# Patient Record
Sex: FEMALE | Race: WHITE | Employment: PART TIME | URBAN - METROPOLITAN AREA
[De-identification: names, ages, dates, MRNs, and addresses within clinical notes are randomized per-mention and may not be internally consistent; named-entity substitution may affect disease eponyms.]

---

## 2017-10-24 ENCOUNTER — OFFICE VISIT (OUTPATIENT)
Dept: FAMILY MEDICINE CLINIC | Age: 42
End: 2017-10-24

## 2017-10-24 VITALS
DIASTOLIC BLOOD PRESSURE: 70 MMHG | HEIGHT: 67 IN | WEIGHT: 139.6 LBS | SYSTOLIC BLOOD PRESSURE: 120 MMHG | BODY MASS INDEX: 21.91 KG/M2

## 2017-10-24 DIAGNOSIS — M05.79 RHEUMATOID ARTHRITIS INVOLVING MULTIPLE SITES WITH POSITIVE RHEUMATOID FACTOR (HCC): Primary | ICD-10-CM

## 2017-10-24 LAB
A/G RATIO: 1.6 (ref 1.1–2.2)
ALBUMIN SERPL-MCNC: 4.5 G/DL (ref 3.4–5)
ALP BLD-CCNC: 38 U/L (ref 40–129)
ALT SERPL-CCNC: 17 U/L (ref 10–40)
ANION GAP SERPL CALCULATED.3IONS-SCNC: 20 MMOL/L (ref 3–16)
AST SERPL-CCNC: 26 U/L (ref 15–37)
BILIRUB SERPL-MCNC: 0.3 MG/DL (ref 0–1)
BUN BLDV-MCNC: 10 MG/DL (ref 7–20)
CALCIUM SERPL-MCNC: 9.5 MG/DL (ref 8.3–10.6)
CHLORIDE BLD-SCNC: 96 MMOL/L (ref 99–110)
CHOLESTEROL, TOTAL: 214 MG/DL (ref 0–199)
CO2: 22 MMOL/L (ref 21–32)
CREAT SERPL-MCNC: 0.6 MG/DL (ref 0.6–1.1)
GFR AFRICAN AMERICAN: >60
GFR NON-AFRICAN AMERICAN: >60
GLOBULIN: 2.9 G/DL
GLUCOSE BLD-MCNC: 73 MG/DL (ref 70–99)
HCT VFR BLD CALC: 42.5 % (ref 36–48)
HDLC SERPL-MCNC: 76 MG/DL (ref 40–60)
HEMOGLOBIN: 14.1 G/DL (ref 12–16)
LDL CHOLESTEROL CALCULATED: 120 MG/DL
MCH RBC QN AUTO: 30.7 PG (ref 26–34)
MCHC RBC AUTO-ENTMCNC: 33.2 G/DL (ref 31–36)
MCV RBC AUTO: 92.6 FL (ref 80–100)
PDW BLD-RTO: 12.4 % (ref 12.4–15.4)
PLATELET # BLD: 246 K/UL (ref 135–450)
PMV BLD AUTO: 8.2 FL (ref 5–10.5)
POTASSIUM SERPL-SCNC: 4 MMOL/L (ref 3.5–5.1)
RBC # BLD: 4.59 M/UL (ref 4–5.2)
RHEUMATOID FACTOR: <10 IU/ML
SEDIMENTATION RATE, ERYTHROCYTE: 16 MM/HR (ref 0–20)
SODIUM BLD-SCNC: 138 MMOL/L (ref 136–145)
TOTAL PROTEIN: 7.4 G/DL (ref 6.4–8.2)
TRIGL SERPL-MCNC: 90 MG/DL (ref 0–150)
TSH REFLEX: 1.34 UIU/ML (ref 0.27–4.2)
URIC ACID, SERUM: 3.5 MG/DL (ref 2.6–6)
VLDLC SERPL CALC-MCNC: 18 MG/DL
WBC # BLD: 6.1 K/UL (ref 4–11)

## 2017-10-24 PROCEDURE — 99203 OFFICE O/P NEW LOW 30 MIN: CPT | Performed by: FAMILY MEDICINE

## 2017-10-24 PROCEDURE — 36415 COLL VENOUS BLD VENIPUNCTURE: CPT | Performed by: FAMILY MEDICINE

## 2017-10-24 ASSESSMENT — ENCOUNTER SYMPTOMS
VOMITING: 0
WHEEZING: 0
SORE THROAT: 0
EYE PAIN: 0
SHORTNESS OF BREATH: 0
DIARRHEA: 0
BLOOD IN STOOL: 0
ABDOMINAL PAIN: 0
TROUBLE SWALLOWING: 0
COUGH: 0
BACK PAIN: 0

## 2017-10-24 ASSESSMENT — PATIENT HEALTH QUESTIONNAIRE - PHQ9
SUM OF ALL RESPONSES TO PHQ QUESTIONS 1-9: 0
SUM OF ALL RESPONSES TO PHQ9 QUESTIONS 1 & 2: 0
2. FEELING DOWN, DEPRESSED OR HOPELESS: 0
1. LITTLE INTEREST OR PLEASURE IN DOING THINGS: 0

## 2017-10-24 NOTE — PROGRESS NOTES
BW drawn 2 sst 1 purple Right arm  No complaints
Normal rate, regular rhythm, normal heart sounds and intact distal pulses. No murmur heard. Pulmonary/Chest: Effort normal and breath sounds normal. No respiratory distress. Abdominal: Soft. Bowel sounds are normal. There is no tenderness. Musculoskeletal: She exhibits no edema. Mild joint swelling at PIP and DIP   min changes at MP joints   no subQ changes  No redness   mild stiffness with grasping   Lymphadenopathy:     She has no cervical adenopathy. Neurological: She is alert and oriented to person, place, and time. Skin: Skin is warm. No rash noted. Psychiatric: She has a normal mood and affect. Thought content normal.       Assessment:      Assessment/Plan:  Jovan Pugh was seen today for established new doctor.     Diagnoses and all orders for this visit:    Rheumatoid arthritis involving multiple sites with positive rheumatoid factor (HCC)  -     Comprehensive Metabolic Panel  -     CBC  -     Sedimentation Rate  -     Rheumatoid Factor  -     TSH with Reflex  -     BHAVIK  -     Lipid Panel  -     Uric Acid  -     Christin Brock MD (Rheumatology)            Plan:      Lab, referral dr Tommy Perez maintenance issues reviewed with patient/family regarding immunizations, colonoscopy, mammo/PAP, eye care, PSA etc .  meds per rheumatology

## 2017-10-26 LAB
ANA INTERPRETATION: ABNORMAL
ANA PATTERN: ABNORMAL
ANA TITER: ABNORMAL
ANTI-NUCLEAR ANTIBODY (ANA): POSITIVE
PATHOLOGIST: ABNORMAL

## 2017-11-02 ENCOUNTER — TELEPHONE (OUTPATIENT)
Dept: INTERNAL MEDICINE CLINIC | Age: 42
End: 2017-11-02

## 2017-11-02 NOTE — TELEPHONE ENCOUNTER
Patient is scheduled for new patient appointment with Dr Rick Villalobos on 11/28/17. She was referred by Dr Luke Rashid. Records in [facility].

## 2017-11-28 ENCOUNTER — OFFICE VISIT (OUTPATIENT)
Dept: RHEUMATOLOGY | Age: 42
End: 2017-11-28

## 2017-11-28 VITALS
SYSTOLIC BLOOD PRESSURE: 110 MMHG | HEIGHT: 67 IN | WEIGHT: 140 LBS | BODY MASS INDEX: 21.97 KG/M2 | DIASTOLIC BLOOD PRESSURE: 68 MMHG

## 2017-11-28 DIAGNOSIS — R76.8 POSITIVE ANA (ANTINUCLEAR ANTIBODY): ICD-10-CM

## 2017-11-28 DIAGNOSIS — M25.50 POLYARTHRALGIA: ICD-10-CM

## 2017-11-28 DIAGNOSIS — M25.50 POLYARTHRALGIA: Primary | ICD-10-CM

## 2017-11-28 LAB
C-REACTIVE PROTEIN: 1.7 MG/L (ref 0–5.1)
HEPATITIS B CORE IGM ANTIBODY: NORMAL
HEPATITIS B SURFACE ANTIGEN INTERPRETATION: NORMAL
HEPATITIS C ANTIBODY INTERPRETATION: NORMAL
SEDIMENTATION RATE, ERYTHROCYTE: 15 MM/HR (ref 0–20)

## 2017-11-28 PROCEDURE — 99244 OFF/OP CNSLTJ NEW/EST MOD 40: CPT | Performed by: INTERNAL MEDICINE

## 2017-11-28 RX ORDER — PREDNISONE 1 MG/1
5 TABLET ORAL 2 TIMES DAILY
Qty: 42 TABLET | Refills: 0 | Status: SHIPPED | OUTPATIENT
Start: 2017-11-28 | End: 2017-12-21

## 2017-11-28 NOTE — PROGRESS NOTES
2017  Patient Name: Laurita Adams  : 1975  Medical Record: [id number]    MEDICATIONS  Current Outpatient Prescriptions   Medication Sig Dispense Refill    predniSONE (DELTASONE) 5 MG tablet Take 1 tablet by mouth 2 times daily 42 tablet 0    fluticasone (FLONASE) 50 MCG/ACT nasal spray 2 sprays by Nasal route daily. 2 sprays each side once/day 1 Bottle 5    acetaminophen (TYLENOL) 500 MG tablet Take 500 mg by mouth every 6 hours as needed for Pain.  polyethyl glycol-propyl glycol 0.4-0.3 % (SYSTANE) 0.4-0.3 % ophthalmic solution 1 drop as needed for Dry Eyes.  ibuprofen (ADVIL;MOTRIN) 200 MG tablet Take 200 mg by mouth every 6 hours as needed for Pain.  b complex vitamins capsule Take 1 capsule by mouth daily.  Cyanocobalamin (VITAMIN B 12 PO) Take 1,000 mg by mouth.  Multiple Vitamins-Minerals (MULTIVITAMIN PO) Take  by mouth.  Iron 66 MG TABS Take  by mouth.  NECON 50, 28, 1-50 MG-MCG TABS        No current facility-administered medications for this visit. ALLERGIES  Allergies   Allergen Reactions    Bactrim Ds [Sulfamethoxazole-Trimethoprim] Other (See Comments)     Sensitivity. Hands tingling and did not \"feel right\".  Macrodantin [Nitrofurantoin Macrocrystal] Other (See Comments)     Sensitivity. Hands tingling and did not \"feel right\"         Comments  No specialty comments available. REFERRING PHYSICIAN: Francesco Olson MD     HISTORY OF PRESENT ILLNESS  Laurita Adams is a 38 y.o. female who is being seen for follow up evaluation of  joint pain. She has seen Dr. Bryan Noguera 3 years ago for positive BHAVIK, further workup was ordered which was unremarkable. She is complaining of pain in the hands located in the PIP joints worse in her right fourth and fifth PIP joint. She describes her pain as constant, 4 out of 10, throbbing, worse with activity/overexertion and mild relief with Tylenol.   She takes Tylenol as needed with mild improvement in symptoms. Her pain occasionally flares up to 7 out of 10. She has had swelling in the right fourth and fifth PIP joint. She has stiffness which is present throughout the day but is worse in the morning. She has occasionally difficulty opening jars cans. Her grandmother has rheumatoid arthritis. She had a workup by primary care physician which showed positive BHAVIK, negative RF and normal ESR. She denies any fatigue, weight loss, fevers. She has occasional sores in the nose. HPI  ROS    REVIEW OF SYSTEMS: Occasional sores in the nose, itching in the eyes  Constitutional: No unanticipated weight loss or fevers. No fatigue and malaise. Integumentary: No rash, photosensitivity, malar rash, livedo reticularis, alopecia and Raynaud's symptoms, sclerodactyly, skin tightening  Eyes: negative for dry eyes, visual disturbance and persistent redness, discharge from eyes   ENT: - No tinnitus, loss of hearing, vertigo, or recurrent ear infections.  - No history of oral ulcers. - No history of sicca symptoms. Cardiovascular: No history of pericarditis, chest pain or murmur or palpitations  Respiratory: No shortness of breath, cough or history of interstitial lung disease. No history of pleurisy. No history of tuberculosis or atypical infections. Gastrointestinal: No history of heart burn, dysphagia or esophageal dysmotility. No change in bowel habits or any inflammatory bowel disease. Genitourinary: No history renal disease, miscarriages. Hematologic/Lymphatic: No abnormal bruising or bleeding, blood clots or swollen lymph nodes. Neurological: No history seizure or focal weakness. No history of neuropathies, paresthesias or hyperesthesias, facial droop, diplopia  Psychiatric: No history of bipolar disease, anxiety, depression  Endocrine: Denies any thyroid / parathyroid disease and osteoporosis  Allergic/Immunologic: No nasal congestion or hives.         I have reviewed patients Past medical History, Social History and Family History as mentioned in her chart and this remains unchanged from previous. History reviewed. No pertinent past medical history. History reviewed. No pertinent surgical history. Social History     Social History    Marital status:      Spouse name: N/A    Number of children: N/A    Years of education: N/A     Occupational History    Not on file.      Social History Main Topics    Smoking status: Never Smoker    Smokeless tobacco: Never Used    Alcohol use Not on file    Drug use: Unknown    Sexual activity: Not on file     Other Topics Concern    Not on file     Social History Narrative    No narrative on file     Family History   Problem Relation Age of Onset    Hypertension Mother     Hypertension Father     Other Father      heart disease    Rheum Arthritis Maternal Grandmother          PHYSICAL EXAM   Vitals:    11/28/17 1019   BP: 110/68   Weight: 140 lb (63.5 kg)   Height: 5' 7\" (1.702 m)     Physical Exam  Constitutional:  Well developed, well nourished, no acute distress, non-toxic appearance   Musculoskeletal:    RIGHT  Swell  Tender  ROM  LEFT  Swell  Tender  ROM    DIP2  0  0  FULL  0  0  FULL    DIP3  0  0  FULL   0  0  FULL    DIP4  0  0  FULL   0  0  FULL    DIP5  0  0  [place]den   0  0  Heberden    PIP1  0  0  FULL   0  0  FULL    PIP2  + + FULL   0  0  FULL    PIP3  +  +  FULL   0  0  FULL    PIP4  +  +  FULL   0  0  FULL    PIP5  +  +  FULL   0  0  FULL    MCP1  0  0  FULL   0  0  FULL    MCP2  0  0  FULL   0  0  FULL    MCP3  0  0  FULL   0  0  FULL    MCP4  0  0  FULL   0  0  FULL    MCP5  0  0  FULL   0  0  FULL    Wrist  0  0  FULL   0  0  FULL    Elbow  0  0  FULL   0  0  FULL    Shouldr  0  0  FULL   0  0  FULL    Hip  0  0  FULL   0  0  FULL    Knee  0  0  FULL   0  0  FULL    Ankle  0  0  FULL   0  0  FULL    MTP1  0  0  FULL   0  0  FULL    MTP2  0  0  FULL   0  0  FULL    MTP3  0  0  FULL   0  0  FULL    MTP4  0  0  FULL   0  0  FULL    MTP5  0  0  FULL ANATITER 1:160 10/24/2017    ANAINT see below 10/24/2017    YARELY Sheffield MD,PhD 10/24/2017     Lab Results   Component Value Date    DSDNAG None Detected 07/08/2014     Lab Results   Component Value Date    SSAROAB Negative 07/08/2014    SSALAAB Negative 07/08/2014     Lab Results   Component Value Date    SMAB Negative 07/08/2014    RNPAB Negative 07/08/2014     ASSESSMENT AND PLAN      Assessment/Plan:      ASSESSMENT:    1. Polyarthralgia    2. Positive BHAVIK (antinuclear antibody)        PLAN:     Demetrio Dupont was seen today for new patient. Diagnoses and all orders for this visit:    Polyarthralgia-Possible inflammatory arthritis/seronegative rheumatoid arthritis  -RF negative, normal ESR  -I will do further workup for rheumatoid arthritis/systemic inflammatory disease  -Start Prednisone 5 mg twice a day for 3 weeks and reassess  -     C-Reactive Protein; Future  -     Cyclic Citrul Peptide Antibody, IgG; Future  -     Hepatitis B Core Antibody, IgM; Future  -     Hepatitis B Surface Antigen; Future  -     Hepatitis C Antibody; Future  -     Sedimentation Rate; Future  -     XR Hand Bilateral Minimum 3 VW; Future  -     predniSONE (DELTASONE) 5 MG tablet; Take 1 tablet by mouth 2 times daily    Positive BHAVKI (antinuclear antibody)-Positive BHAVIK 1:160 homogeneous pattern. Implications of low titer positive BHAVIK were discussed with patient. About 15-20% of normal healthy individuals at her age may have low titer positive BHAVIK of unclear clinical significance. -Check other serologies to evaluate for positive BHAVIK    -     Anti-DNA Antibody, Double-Stranded; Future  -     Sjogrens syndrome-A extractable nuclear antibody; Future  -     Sjogrens syndrome-B extractable nuclear antibody; Future  -     Smith (SONJA) Antibody IgG; Future  -     RIBONUCLEIC PROTEIN ANTIBODY; Future      The patient indicates understanding of these issues and agrees with the plan. Return in about 3 weeks (around 12/19/2017).       The risks and

## 2017-11-28 NOTE — PATIENT INSTRUCTIONS
-     C-Reactive Protein; Future  -     Cyclic Citrul Peptide Antibody, IgG; Future  -     Hepatitis B Core Antibody, IgM; Future  -     Hepatitis B Surface Antigen; Future  -     Hepatitis C Antibody; Future  -     Sedimentation Rate; Future  -     XR Hand Bilateral Minimum 3 VW; Future  -     predniSONE (DELTASONE) 5 MG tablet; Take 1 tablet by mouth 2 times daily  -     Anti-DNA Antibody, Double-Stranded; Future  -     Sjogrens syndrome-A extractable nuclear antibody; Future  -     Sjogrens syndrome-B extractable nuclear antibody; Future  -     Smith (SONJA) Antibody IgG; Future  -     RIBONUCLEIC PROTEIN ANTIBODY;  Future

## 2017-11-30 LAB
CCP IGG ANTIBODIES: 4 UNITS (ref 0–19)
DOUBLE STRANDED DNA AB, IGG: NORMAL
ENA TO RNP ANTIBODY: 0 AU/ML (ref 0–40)
ENA TO SMITH (SM) ANTIBODY: 0 AU/ML (ref 0–40)
ENA TO SSB (LA) ANTIBODY: 3 AU/ML (ref 0–40)
SSA 52 (RO) (ENA) AB, IGG: 2 AU/ML (ref 0–40)
SSA 60 (RO) (ENA) AB, IGG: 11 AU/ML (ref 0–40)

## 2017-12-21 ENCOUNTER — OFFICE VISIT (OUTPATIENT)
Dept: RHEUMATOLOGY | Age: 42
End: 2017-12-21

## 2017-12-21 VITALS
DIASTOLIC BLOOD PRESSURE: 70 MMHG | HEIGHT: 67 IN | WEIGHT: 139.6 LBS | BODY MASS INDEX: 21.91 KG/M2 | SYSTOLIC BLOOD PRESSURE: 126 MMHG

## 2017-12-21 DIAGNOSIS — M19.90 INFLAMMATORY ARTHRITIS: Primary | ICD-10-CM

## 2017-12-21 DIAGNOSIS — R76.8 POSITIVE ANA (ANTINUCLEAR ANTIBODY): ICD-10-CM

## 2017-12-21 PROCEDURE — 99214 OFFICE O/P EST MOD 30 MIN: CPT | Performed by: INTERNAL MEDICINE

## 2017-12-21 RX ORDER — HYDROXYCHLOROQUINE SULFATE 200 MG/1
TABLET, FILM COATED ORAL
Qty: 45 TABLET | Refills: 5 | Status: SHIPPED | OUTPATIENT
Start: 2017-12-21 | End: 2021-04-14

## 2017-12-21 RX ORDER — PREDNISONE 1 MG/1
5 TABLET ORAL 2 TIMES DAILY
Qty: 10 TABLET | Refills: 1 | Status: SHIPPED | OUTPATIENT
Start: 2017-12-21 | End: 2017-12-26

## 2017-12-21 NOTE — PROGRESS NOTES
Tylenol. She takes Tylenol as needed with mild improvement in symptoms. Her pain occasionally flares up to 7 out of 10. She has had swelling in the right fourth and fifth PIP joint. She has stiffness which is present throughout the day but is worse in the morning. She has occasionally difficulty opening jars cans. Her grandmother has rheumatoid arthritis. She had a workup by primary care physician which showed positive BHAVIK, negative RF and normal ESR. She denies any fatigue, weight loss, fevers. She has occasional sores in the nose. Interim history: He presents for follow-up of polyarthralgia and positive BHAVIK. She has noticed significant improvement with prednisone. She finished prednisone 1 day ago. Joint pain, swelling and stiffness has improved by 80%. Blood work shows negative RF, CCP, normal ESR and CRP. Hand x-rays are normal.  HPI  ROS    REVIEW OF SYSTEMS: Occasional sores in the nose, itching in the eyes  Constitutional: No unanticipated weight loss or fevers. No fatigue and malaise. Integumentary: No rash, photosensitivity, malar rash, livedo reticularis, alopecia and Raynaud's symptoms, sclerodactyly, skin tightening  Eyes: negative for dry eyes, visual disturbance and persistent redness, discharge from eyes   ENT: - No tinnitus, loss of hearing, vertigo, or recurrent ear infections.  - No history of oral ulcers. - No history of sicca symptoms. Cardiovascular: No history of pericarditis, chest pain or murmur or palpitations  Respiratory: No shortness of breath, cough or history of interstitial lung disease. No history of pleurisy. No history of tuberculosis or atypical infections. Gastrointestinal: No history of heart burn, dysphagia or esophageal dysmotility. No change in bowel habits or any inflammatory bowel disease. Genitourinary: No history renal disease, miscarriages. Hematologic/Lymphatic: No abnormal bruising or bleeding, blood clots or swollen lymph nodes.   Neurological: No history seizure or focal weakness. No history of neuropathies, paresthesias or hyperesthesias, facial droop, diplopia  Psychiatric: No history of bipolar disease, anxiety, depression  Endocrine: Denies any thyroid / parathyroid disease and osteoporosis  Allergic/Immunologic: No nasal congestion or hives. I have reviewed patients Past medical History, Social History and Family History as mentioned in her chart and this remains unchanged from previous. History reviewed. No pertinent past medical history. History reviewed. No pertinent surgical history. Social History     Social History    Marital status:      Spouse name: N/A    Number of children: N/A    Years of education: N/A     Occupational History    Not on file.      Social History Main Topics    Smoking status: Never Smoker    Smokeless tobacco: Never Used    Alcohol use Not on file    Drug use: Unknown    Sexual activity: Not on file     Other Topics Concern    Not on file     Social History Narrative    No narrative on file     Family History   Problem Relation Age of Onset    Hypertension Mother     Hypertension Father     Other Father      heart disease    Rheum Arthritis Maternal Grandmother          PHYSICAL EXAM   Vitals:    12/21/17 0859   BP: 126/70   Weight: 139 lb 9.6 oz (63.3 kg)   Height: 5' 7\" (1.702 m)     Physical Exam  Constitutional:  Well developed, well nourished, no acute distress, non-toxic appearance   Musculoskeletal:    RIGHT  Swell  Tender  ROM  LEFT  Swell  Tender  ROM    DIP2  0  0  FULL  0  0  FULL    DIP3  0  0  FULL   0  0  FULL    DIP4  0  0  FULL   0  0  FULL    DIP5  0  0  Heberden   0  0  Heberden    PIP1  0  0  FULL   0  0  FULL    PIP2  0 0 FULL   0  0  FULL    PIP3  0 0 FULL   0  0  FULL    PIP4  0 0 FULL   0  0  FULL    PIP5  0 0 FULL   0  0  FULL    MCP1  0  0  FULL   0  0  FULL    MCP2  0  0  FULL   0  0  FULL    MCP3  0  0  FULL   0  0  FULL    MCP4  0  0  FULL   0  0  FULL    MCP5  0 AST 26 10/24/2017 1108    ALT 17 10/24/2017 1108    BILITOT 0.3 10/24/2017 1108          Lab Results   Component Value Date    SEDRATE 15 11/28/2017     Lab Results   Component Value Date    CRP 1.7 11/28/2017     Lab Results   Component Value Date    BHAVIK POSITIVE 10/24/2017     Lab Results   Component Value Date    RF <10.0 10/24/2017    CCPABIGG 4 11/28/2017     Lab Results   Component Value Date    BHAVIK POSITIVE 10/24/2017    ANATITER 1:160 10/24/2017    ANAINT see below 10/24/2017    PATH MARY Sheffield MD,PhD 10/24/2017     Lab Results   Component Value Date    DSDNAG None Detected 11/28/2017     Lab Results   Component Value Date    SSAROAB Negative 07/08/2014    SSALAAB 3 11/28/2017     Lab Results   Component Value Date    SMAB 0 11/28/2017    RNPAB 0 11/28/2017     ASSESSMENT AND PLAN      Assessment/Plan:      ASSESSMENT:    1. Inflammatory arthritis    2. Positive BHAVIK (antinuclear antibody)        PLAN:   1. Inflammatory arthritis  Given inflammatory features, likely synovitis on exam with significant improvement on prednisone, family history of rheumatoid arthritis, suspect inflammatory arthritis-possible seronegative rheumatoid arthritis  -I will start Plaquenil 200 mg in a.m. and 100 mg at night  -Prednisone for flareups  -If symptoms persist will consider methotrexate  - hydroxychloroquine (PLAQUENIL) 200 MG tablet; Take 1 tab in am and 1/2 tab in am  Dispense: 45 tablet; Refill: 5  - predniSONE (DELTASONE) 5 MG tablet; Take 1 tablet by mouth 2 times daily for 5 days  Dispense: 10 tablet; Refill: 1  Plaquenil can cause skin rash, GI issues, visual blurriness and increases the risk of retinal toxicity; yearly eye/retinal exam was advised while taking plaquenil. 2. Positive BHAVIK (antinuclear antibody)  Implications of low titer positive BHAVIK were discussed with patient. About 15-20% of normal healthy individuals at her age may have low titer positive BHAVIK of unclear clinical significance.   -Other

## 2017-12-21 NOTE — PATIENT INSTRUCTIONS
- hydroxychloroquine (PLAQUENIL) 200 MG tablet; Take 1 tab in am and 1/2 tab in am  Dispense: 45 tablet; Refill: 5  - predniSONE (DELTASONE) 5 MG tablet; Take 1 tablet by mouth 2 times daily for 5 days  Dispense: 10 tablet;  Refill: 1

## 2018-01-23 ENCOUNTER — HOSPITAL ENCOUNTER (OUTPATIENT)
Dept: MAMMOGRAPHY | Age: 43
Discharge: HOME | End: 2018-01-23
Attending: OBSTETRICS & GYNECOLOGY | Admitting: OBSTETRICS & GYNECOLOGY

## 2018-01-23 DIAGNOSIS — Z12.31 ENCOUNTER FOR SCREENING MAMMOGRAM FOR BREAST CANCER: ICD-10-CM

## 2018-01-23 DIAGNOSIS — Z80.3 FAMILY HISTORY OF MALIGNANT NEOPLASM OF BREAST: ICD-10-CM

## 2019-11-08 ENCOUNTER — HOSPITAL ENCOUNTER (OUTPATIENT)
Dept: WOMENS IMAGING | Age: 44
Discharge: HOME | End: 2019-11-08
Payer: COMMERCIAL

## 2019-11-08 DIAGNOSIS — Z12.31 ENCOUNTER FOR SCREENING MAMMOGRAM FOR BREAST CANCER: ICD-10-CM

## 2019-11-08 PROCEDURE — 77063 BREAST TOMOSYNTHESIS BI: CPT

## 2021-03-19 ENCOUNTER — HOSPITAL ENCOUNTER (OUTPATIENT)
Dept: WOMENS IMAGING | Age: 46
Discharge: HOME | End: 2021-03-19
Payer: COMMERCIAL

## 2021-03-19 DIAGNOSIS — Z12.31 VISIT FOR SCREENING MAMMOGRAM: ICD-10-CM

## 2021-03-19 PROCEDURE — 77063 BREAST TOMOSYNTHESIS BI: CPT

## 2021-03-23 ENCOUNTER — TELEPHONE (OUTPATIENT)
Dept: FAMILY MEDICINE CLINIC | Age: 46
End: 2021-03-23

## 2021-04-14 ENCOUNTER — OFFICE VISIT (OUTPATIENT)
Dept: PRIMARY CARE CLINIC | Age: 46
End: 2021-04-14
Payer: COMMERCIAL

## 2021-04-14 VITALS
DIASTOLIC BLOOD PRESSURE: 70 MMHG | HEART RATE: 90 BPM | BODY MASS INDEX: 21.82 KG/M2 | HEIGHT: 67 IN | SYSTOLIC BLOOD PRESSURE: 110 MMHG | OXYGEN SATURATION: 97 % | WEIGHT: 139 LBS

## 2021-04-14 DIAGNOSIS — M19.90 ARTHRITIS: Primary | ICD-10-CM

## 2021-04-14 DIAGNOSIS — Z23 NEED FOR TDAP VACCINATION: ICD-10-CM

## 2021-04-14 PROCEDURE — 99203 OFFICE O/P NEW LOW 30 MIN: CPT | Performed by: NURSE PRACTITIONER

## 2021-04-14 ASSESSMENT — ENCOUNTER SYMPTOMS
SHORTNESS OF BREATH: 0
CHEST TIGHTNESS: 0

## 2021-04-14 ASSESSMENT — PATIENT HEALTH QUESTIONNAIRE - PHQ9
1. LITTLE INTEREST OR PLEASURE IN DOING THINGS: 0
SUM OF ALL RESPONSES TO PHQ QUESTIONS 1-9: 0
SUM OF ALL RESPONSES TO PHQ9 QUESTIONS 1 & 2: 0

## 2021-04-14 NOTE — PATIENT INSTRUCTIONS
Patient Education        Well Visit, Ages 25 to 48: Care Instructions  Overview     Well visits can help you stay healthy. Your doctor has checked your overall health and may have suggested ways to take good care of yourself. Your doctor also may have recommended tests. At home, you can help prevent illness with healthy eating, regular exercise, and other steps. Follow-up care is a key part of your treatment and safety. Be sure to make and go to all appointments, and call your doctor if you are having problems. It's also a good idea to know your test results and keep a list of the medicines you take. How can you care for yourself at home? · Get screening tests that you and your doctor decide on. Screening helps find diseases before any symptoms appear. · Eat healthy foods. Choose fruits, vegetables, whole grains, protein, and low-fat dairy foods. Limit fat, especially saturated fat. Reduce salt in your diet. · Limit alcohol. If you are a man, have no more than 2 drinks a day or 14 drinks a week. If you are a woman, have no more than 1 drink a day or 7 drinks a week. · Get at least 30 minutes of physical activity on most days of the week. Walking is a good choice. You also may want to do other activities, such as running, swimming, cycling, or playing tennis or team sports. Discuss any changes in your exercise program with your doctor. · Reach and stay at a healthy weight. This will lower your risk for many problems, such as obesity, diabetes, heart disease, and high blood pressure. · Do not smoke or allow others to smoke around you. If you need help quitting, talk to your doctor about stop-smoking programs and medicines. These can increase your chances of quitting for good. · Care for your mental health. It is easy to get weighed down by worry and stress. Learn strategies to manage stress, like deep breathing and mindfulness, and stay connected with your family and community.  If you find you often feel sad or hopeless, talk with your doctor. Treatment can help. · Talk to your doctor about whether you have any risk factors for sexually transmitted infections (STIs). You can help prevent STIs if you wait to have sex with a new partner (or partners) until you've each been tested for STIs. It also helps if you use condoms (male or female condoms) and if you limit your sex partners to one person who only has sex with you. Vaccines are available for some STIs, such as HPV. · Use birth control if it's important to you to prevent pregnancy. Talk with your doctor about the choices available and what might be best for you. · If you think you may have a problem with alcohol or drug use, talk to your doctor. This includes prescription medicines (such as amphetamines and opioids) and illegal drugs (such as cocaine and methamphetamine). Your doctor can help you figure out what type of treatment is best for you. · Protect your skin from too much sun. When you're outdoors from 10 a.m. to 4 p.m., stay in the shade or cover up with clothing and a hat with a wide brim. Wear sunglasses that block UV rays. Even when it's cloudy, put broad-spectrum sunscreen (SPF 30 or higher) on any exposed skin. · See a dentist one or two times a year for checkups and to have your teeth cleaned. · Wear a seat belt in the car. When should you call for help? Watch closely for changes in your health, and be sure to contact your doctor if you have any problems or symptoms that concern you. Where can you learn more? Go to [email or web address]://[email or web address].[email or web address].com. org and sign in to your [employer] account. Enter P072 in the [employer] box to learn more about \"Well Visit, Ages 25 to 48: Care Instructions. \"     If you do not have an account, please click on the \"Sign Up Now\" link. Current as of: May 27, 2020               Content Version: 12.8  © 0984-7239 [employer]. Care instructions adapted under license by [facility] ([facility]). If you have questions about a medical condition or this instruction, always ask your healthcare professional. [address] any warranty or liability for your use of this information. Patient Education        Arthritis: Care Instructions  Overview     Arthritis, also called osteoarthritis, is a breakdown of the cartilage that cushions your joints. When the cartilage wears down, your bones rub against each other. This causes pain and stiffness. Many people have some arthritis as they age. Arthritis most often affects the joints of the spine, hands, hips, knees, or feet. Arthritis never goes away completely. But medicine and home treatment can help reduce pain and help you stay active. Follow-up care is a key part of your treatment and safety. Be sure to make and go to all appointments, and call your doctor if you are having problems. It's also a good idea to know your test results and keep a list of the medicines you take. How can you care for yourself at home? · Stay at a healthy weight. Being overweight puts extra strain on your joints. · Talk to your doctor or physical therapist about exercises that will help ease joint pain. ? Stretch. You may enjoy gentle forms of yoga to help keep your joints and muscles flexible. ? Walk instead of jog. Other types of exercise that are less stressful on the joints include riding a bike, swimming, anh chi, or water exercise. ? Lift weights. Strong muscles help reduce stress on your joints. Stronger thigh muscles, for example, take some of the stress off of the knees and hips. Learn the right way to lift weights so you don't make joint pain worse. · Take your medicines exactly as prescribed. Call your doctor if you think you are having a problem with your medicine. · Take pain medicines exactly as directed. ? If the doctor gave you a prescription medicine for pain, take it as prescribed.   ? If you are not taking a prescription pain information.

## 2021-04-14 NOTE — PROGRESS NOTES
4/14/2021    Chief Complaint   Patient presents with    New Patient    Joint Pain     bilateral hands       Flora Henriquez is a 48 y.o. female, presents today as a new patient to [facility], however new to me. HPI   Patient states she is overall healthy, recently had negative mammogram, recent PAP with gynecologist, she exercises regularly, eats a well balanced diet, and takes daily multivitamin and supplements. She has yearly biometric screenings through her 's employer/insurance. Reviewed results today and scanned into chart. Her total cholesterol and LDL slightly above upper normal limits, with HDL elevated. All other lab results were within normal range. Today she has some concerns for arthritis in hands, started several years ago and has waxed and waned. Patient's Paternal Grandmother has rheumatoid arthritis (RA) and both parents have arthritis. She made this appointment for a referral to rheumatology, Dr. Jennie Ledesma. She tried to schedule an appointment at rheumatology office and was told she needed a referral from primary care. She would like labs ordered by rheumatology to rule out RA. Patient is due to Tdap vaccine however 2nd COVID-19 vaccine was administered yesterday. Order written today to be administered at local pharmacy in 6-8 weeks. Patient to update office with administration date. Review of Systems   Constitutional: Negative for activity change, fatigue and unexpected weight change. Respiratory: Negative for chest tightness and shortness of breath. Cardiovascular: Negative for chest pain, palpitations and leg swelling. Musculoskeletal: Positive for arthralgias (bilateral hands). Negative for gait problem, joint swelling and myalgias. Neurological: Negative for weakness and numbness.        Current Outpatient Medications on File Prior to Visit   Medication Sig Dispense Refill    polyethyl glycol-propyl glycol 0.4-0.3 % (SYSTANE) 0.4-0.3 % ophthalmic solution 1 drop as needed for Dry Eyes.  b complex vitamins capsule Take 1 capsule by mouth daily.  Cyanocobalamin (VITAMIN B 12 PO) Take 1,000 mg by mouth.  Multiple Vitamins-Minerals (MULTIVITAMIN PO) Take  by mouth.  NECON 1/50, 28, 1-50 MG-MCG TABS        No current facility-administered medications on file prior to visit. Allergies   Allergen Reactions    Bactrim Ds [Sulfamethoxazole-Trimethoprim] Other (See Comments)     Sensitivity. Hands tingling and did not \"feel right\".  Macrodantin [Nitrofurantoin Macrocrystal] Other (See Comments)     Sensitivity. Hands tingling and did not \"feel right\"     History reviewed. No pertinent past medical history. History reviewed. No pertinent surgical history. Social History     Tobacco Use    Smoking status: Never Smoker    Smokeless tobacco: Never Used   Substance Use Topics    Alcohol use: Yes     Alcohol/week: 1.0 standard drinks     Types: 1 Glasses of wine per week      Family History   Problem Relation Age of Onset   Jo Ann Mons Hypertension Mother     Hypertension Father     Other Father         heart disease    Rheum Arthritis Maternal Grandmother     No Known Problems Daughter     No Known Problems Son         Vitals:    04/14/21 0823   BP: 110/70   Site: Left Upper Arm   Position: Sitting   Cuff Size: Medium Adult   Pulse: 90   SpO2: 97%   Weight: 139 lb (63 kg)   Height: 5' 7\" (1.702 m)     Estimated body mass index is 21.77 kg/m² as calculated from the following:    Height as of this encounter: 5' 7\" (1.702 m). Weight as of this encounter: 139 lb (63 kg). Physical Exam  Vitals signs and nursing note reviewed. Constitutional:       General: She is not in acute distress. Appearance: Normal appearance. She is normal weight.    HENT:      Right Ear: Tympanic membrane, ear canal and external ear normal.      Left Ear: Tympanic membrane, ear canal and external ear normal.      Nose: Nose normal. Mouth/Throat:      Mouth: Mucous membranes are moist.      Pharynx: Oropharynx is clear. Neck:      Musculoskeletal: Normal range of motion and neck supple. Vascular: No carotid bruit. Cardiovascular:      Rate and Rhythm: Normal rate and regular rhythm. Pulses: Normal pulses. Heart sounds: Normal heart sounds. No murmur. No friction rub. No gallop. Pulmonary:      Effort: Pulmonary effort is normal. No respiratory distress. Breath sounds: Normal breath sounds. Abdominal:      General: Abdomen is flat. Bowel sounds are normal.      Palpations: Abdomen is soft. Musculoskeletal: Normal range of motion. General: No swelling, tenderness, deformity or signs of injury. Right hand: Normal. She exhibits normal range of motion, no tenderness, no bony tenderness, normal capillary refill, no deformity and no swelling. Normal strength noted. Left hand: She exhibits normal range of motion, no tenderness, no bony tenderness, normal capillary refill, no deformity and no swelling. Normal strength noted. Right lower leg: No edema. Left lower leg: No edema. Lymphadenopathy:      Cervical: No cervical adenopathy. Skin:     General: Skin is warm and dry. Neurological:      Mental Status: She is alert and oriented to person, place, and time. Psychiatric:         Mood and Affect: Mood normal.         Behavior: Behavior normal.         ASSESSMENT/PLAN:  1. Arthritis  - Chronic  - External Referral To Rheumatology (Dr. Christal Carcamo). Patient to schedule appointment, phone number provided. - Arthritis lab panel to be ordered by Rheumatology. 2. Need for Tdap vaccination  - Tdap (age 6y and older) IM (Boostrix); Future in 6-8 weeks (2nd COVID vaccine administered yesterday)      Return in about 1 year (around 4/14/2022) for Annual physical exam or sooner if needed. .     Discussed use, benefit, and side effects of prescribed medications.      Patient's questions answered and

## 2022-04-29 LAB — MAMMOGRAPHY, EXTERNAL: NORMAL

## 2023-03-27 ENCOUNTER — OFFICE VISIT (OUTPATIENT)
Dept: PRIMARY CARE CLINIC | Age: 48
End: 2023-03-27
Payer: COMMERCIAL

## 2023-03-27 VITALS
HEIGHT: 67 IN | RESPIRATION RATE: 14 BRPM | DIASTOLIC BLOOD PRESSURE: 60 MMHG | WEIGHT: 147 LBS | BODY MASS INDEX: 23.07 KG/M2 | HEART RATE: 70 BPM | OXYGEN SATURATION: 97 % | TEMPERATURE: 98 F | SYSTOLIC BLOOD PRESSURE: 122 MMHG

## 2023-03-27 DIAGNOSIS — G89.29 CHRONIC NECK PAIN: Primary | ICD-10-CM

## 2023-03-27 DIAGNOSIS — Z12.12 ENCOUNTER FOR COLORECTAL CANCER SCREENING: ICD-10-CM

## 2023-03-27 DIAGNOSIS — M54.2 CHRONIC NECK PAIN: Primary | ICD-10-CM

## 2023-03-27 DIAGNOSIS — Z12.11 ENCOUNTER FOR COLORECTAL CANCER SCREENING: ICD-10-CM

## 2023-03-27 PROCEDURE — 99213 OFFICE O/P EST LOW 20 MIN: CPT | Performed by: NURSE PRACTITIONER

## 2023-03-27 RX ORDER — CETIRIZINE HYDROCHLORIDE 10 MG/1
10 TABLET ORAL DAILY
COMMUNITY

## 2023-03-27 SDOH — ECONOMIC STABILITY: FOOD INSECURITY: WITHIN THE PAST 12 MONTHS, THE FOOD YOU BOUGHT JUST DIDN'T LAST AND YOU DIDN'T HAVE MONEY TO GET MORE.: NEVER TRUE

## 2023-03-27 SDOH — ECONOMIC STABILITY: INCOME INSECURITY: HOW HARD IS IT FOR YOU TO PAY FOR THE VERY BASICS LIKE FOOD, HOUSING, MEDICAL CARE, AND HEATING?: NOT HARD AT ALL

## 2023-03-27 SDOH — ECONOMIC STABILITY: HOUSING INSECURITY
IN THE LAST 12 MONTHS, WAS THERE A TIME WHEN YOU DID NOT HAVE A STEADY PLACE TO SLEEP OR SLEPT IN A SHELTER (INCLUDING NOW)?: NO

## 2023-03-27 SDOH — ECONOMIC STABILITY: FOOD INSECURITY: WITHIN THE PAST 12 MONTHS, YOU WORRIED THAT YOUR FOOD WOULD RUN OUT BEFORE YOU GOT MONEY TO BUY MORE.: NEVER TRUE

## 2023-03-27 ASSESSMENT — PATIENT HEALTH QUESTIONNAIRE - PHQ9
SUM OF ALL RESPONSES TO PHQ QUESTIONS 1-9: 0
SUM OF ALL RESPONSES TO PHQ9 QUESTIONS 1 & 2: 0
1. LITTLE INTEREST OR PLEASURE IN DOING THINGS: 0
2. FEELING DOWN, DEPRESSED OR HOPELESS: 0

## 2023-03-27 ASSESSMENT — ENCOUNTER SYMPTOMS
APNEA: 0
SHORTNESS OF BREATH: 0
WHEEZING: 0
CHEST TIGHTNESS: 0
COUGH: 0
GASTROINTESTINAL NEGATIVE: 1
BACK PAIN: 0

## 2023-03-27 NOTE — PROGRESS NOTES
Vascular: No carotid bruit. Trachea: Trachea normal.   Cardiovascular:      Rate and Rhythm: Normal rate and regular rhythm. Pulses: Normal pulses. Heart sounds: Normal heart sounds, S1 normal and S2 normal.   Pulmonary:      Effort: Pulmonary effort is normal.      Breath sounds: Normal breath sounds. Musculoskeletal:         General: Normal range of motion. Cervical back: Normal range of motion and neck supple. No signs of trauma, rigidity or tenderness. No pain with movement, spinous process tenderness or muscular tenderness. Normal range of motion. Right lower leg: No edema. Left lower leg: No edema. Lymphadenopathy:      Cervical: No cervical adenopathy. Skin:     General: Skin is warm. Neurological:      General: No focal deficit present. Mental Status: She is alert and oriented to person, place, and time. Psychiatric:         Mood and Affect: Mood normal.         Behavior: Behavior normal.         Thought Content: Thought content normal.       ASSESSMENT/PLAN:  1. Chronic neck pain  - Chronic, intermittent neck pain  - Internal Referral to [facility]    2. Encounter for colorectal cancer screening  - [facility] - Darlene Tillman MD, Gastroenterology, SYMONE LEACH [facility]    Return if symptoms worsen or fail to improve. Discussed use, benefit, and side effects of prescribed medications. Patient's questions answered and concerns addressed. Patient agrees to plan of care. Electronically signed by HALLEY Dueñas CNP on 3/27/2023 at 10:20 PM       This dictation was generated by voice recognition computer software. Although all attempts are made to edit the dictation for accuracy, there may be errors in the transcription that are not intended.

## 2023-04-05 SDOH — HEALTH STABILITY: PHYSICAL HEALTH: ON AVERAGE, HOW MANY DAYS PER WEEK DO YOU ENGAGE IN MODERATE TO STRENUOUS EXERCISE (LIKE A BRISK WALK)?: 6 DAYS

## 2023-04-05 SDOH — HEALTH STABILITY: PHYSICAL HEALTH: ON AVERAGE, HOW MANY MINUTES DO YOU ENGAGE IN EXERCISE AT THIS LEVEL?: 50 MIN

## 2023-04-05 ASSESSMENT — SOCIAL DETERMINANTS OF HEALTH (SDOH)

## 2023-04-06 ENCOUNTER — OFFICE VISIT (OUTPATIENT)
Dept: ORTHOPEDIC SURGERY | Age: 48
End: 2023-04-06
Payer: COMMERCIAL

## 2023-04-06 DIAGNOSIS — M47.812 FACET ARTHRITIS OF CERVICAL REGION: ICD-10-CM

## 2023-04-06 DIAGNOSIS — M54.2 NECK PAIN: Primary | ICD-10-CM

## 2023-04-06 DIAGNOSIS — R51.9 OCCIPITAL PAIN: ICD-10-CM

## 2023-04-06 PROCEDURE — 99203 OFFICE O/P NEW LOW 30 MIN: CPT | Performed by: PHYSICIAN ASSISTANT

## 2023-04-06 NOTE — PROGRESS NOTES
about 2.0 standard drinks per week. She reports that she does not use drugs.    Family History:   Family History   Problem Relation Age of Onset    Hypertension Mother     Hypertension Father     Other Father         heart disease    High Blood Pressure Father     Rheum Arthritis Maternal Grandmother     No Known Problems Daughter     No Known Problems Son          Review of Systems:  I have reviewed the clinically relevant past medical history, medications, allergies, family history, social history, and 13 point Review of Systems from the patient's recent history form & documented any details relevant to today's presenting complaints in the history above. The patient's self-reported past medical history, medications, allergies, family history, social history, and Review of Systems form from today's date date have been scanned into the chart under the \"Media\" tab.    PHYSICAL EXAM:    Vitals:     GENERAL EXAM:  General Apparence: Patient is adequately groomed with no evidence of malnutrition.  Orientation: The patient is oriented to time, place and person.   Mood & Affect:The patient's mood and affect are appropriate   Vascular: Examination reveals no swelling tenderness in upper or lower extremities. Good capillary refill  Lymphatic: The lymphatic examination bilaterally reveals all areas to be without enlargement or induration  Sensation: Sensation is intact without deficit  Coordination/Balance: Good coordination. Tandem walking normal.     CERVICAL EXAMINATION:  Inspection: Local inspection shows no step-off or bruising.  Cervical alignment is normal.     Palpation: Mildly tender at the occipital cervical junction just lateral.  There is no tenderness at the midline below C2, and trapezius.  Paraspinal tenderness is absent. There is no step-off or paraspinal spasm.   Range of Motion: Cervical flexion, extension, and rotation are mildly reduced with pain.  Strength: 5/5 bilateral upper extremities   Special

## 2023-04-21 ENCOUNTER — HOSPITAL ENCOUNTER (OUTPATIENT)
Dept: PHYSICAL THERAPY | Age: 48
Setting detail: THERAPIES SERIES
Discharge: HOME | End: 2023-04-21
Payer: COMMERCIAL

## 2023-04-21 DIAGNOSIS — M54.2 CERVICAL PAIN (NECK): Primary | ICD-10-CM

## 2023-04-21 PROCEDURE — 97161 PT EVAL LOW COMPLEX 20 MIN: CPT | Performed by: PHYSICAL THERAPIST

## 2023-04-21 PROCEDURE — 97112 NEUROMUSCULAR REEDUCATION: CPT | Performed by: PHYSICAL THERAPIST

## 2023-04-21 PROCEDURE — 97140 MANUAL THERAPY 1/> REGIONS: CPT | Performed by: PHYSICAL THERAPIST

## 2023-04-21 NOTE — FLOWSHEET NOTE
[facility]  Phone: [phone]   Fax: [phone]      Physical Therapy Daily Treatment Note  Date:  2023     Patient Name:  Bubba Sandhu    :  1975  MRN: [id number]    Medical Diagnosis:  Facet arthritis of cervical region [M47.812]  Treatment Diagnosis:    ICD-10-CM    1.  Cervical pain (neck)  M54.2         Insurance/Certification information:  PT Insurance Information: [employer]- ded not met, [employer] auth, 60 visits  Referring Provider:  ONIEL Doyle   Has the plan of care been signed (Y/N):        []  Yes  [x]  No     Date of Patient follow up with Physician: none scheduled      Is this a Progress Report:     []  Yes  [x]  No        If Yes:  Date Range for reporting period:  Beginning- 2023    Ending    Progress report will be due (10 Rx or 30 days whichever is less): 10 visits or        Recertification will be due (POC Duration  / 90 days whichever is less): as above        Visit # Insurance Allowable Auth Required   1 [employer] auth, 60 visits per year [x]  Yes []  No        Functional Scale: NDI- 4%    Date assessed:  2023      Latex Allergy:  [x]NO      []YES  Preferred Language for Healthcare:   [x]English       []other:      Pain level:  0-5/10  on 2023    SUBJECTIVE:  See eval    OBJECTIVE: See eval  Observation:   Test measurements:    AROM Left  Right   Comments   Cervical Flexion     64 Mild tihgntess around C7   Cervical Extension     60 Mild pain L suboccipital   Cervical Side bend 38 38   Tightness B UTs   Cervical Rotation 55 65   Mild tigtness L suboccipital area   Thoracic rotation     ~20% limit B     Shoulder flex     WNL     Shoulder ABD     WNL     Shoulder IR           Shoulder ER at 90     WNL             SUPINE EXAM Normal Abnormal N/A Comments   Modified shear test           C2 spinous process kick           Tectorial membrane           Distraction x

## 2023-04-21 NOTE — PLAN OF CARE
sure what brings the pain on.    She denies lower extremity symptoms, gait abnormality, saddle numbness and bowel or bladder dysfunction    Relevant Medical History:  Functional Disability Index: NDI- 4%  Relevant Medication:   Tylenol occasionally for headache or stiffness  Current pain:  0/10  Pain at worst:  5-6/10- L side subocciptial area    Easing factors: applying some pressure into suboccipitals, laying in bed  Provocative factors:  turning head quickly to R or L,     Type: []Constant   [x]Intermittent  []Radiating [x]Localized []other:     Numbness/Tingling: none    Functional Limitations/Impairments: []Lifting/reaching []Grooming []Carrying    []ADL's []Driving []Sports/Recreations   []Other:    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; works out    Living Status/Prior Level of Function: Independent with ADLs and IADLs, without pain in neck      OBJECTIVE:    STANDING EXAM Normal Abnormal N/A Comments   [facility]         SEATED EXAM       Dermatomes Normal Abnormal N/A Comment   Posterior aspect of head (C2)       Posterior aspect of neck (C3)       AC jt (C4)       Lateral arm (C5)       Lateral forearm and palmar tip (C6)       Palmar distal phalynx middle finger (C7)       Palmar distal phalynx little finger (C8)       Medial forearm (T1)       Medial arm (T2)       Myotomes Normal Abnormal N/A Comments   Cervical rotation (C1)       Shoulder shrug (C2,3,4)       Shoulder abduction (C5)       Shoulder ER (C5,6)       Elbow flexion (C5,6)       Elbow extension (C7)       Thumb abduction (C8)       Little finger abduction (T1)       Finger adduction (T1)       Reflexes Normal Abnormal N/A Comments   C5-6 Biceps       C6 Brachioradialis       C7-8 Triceps       Clonus (>3 beats is +)       Babinski        Edward        Jaw Jerk        Pectoralis       Ronald       AROM Left  Right  Comments   Cervical Flexion   64 Mild tihgntess around C7   Cervical Extension   60 Mild pain L

## 2023-05-02 ENCOUNTER — HOSPITAL ENCOUNTER (OUTPATIENT)
Dept: PHYSICAL THERAPY | Age: 48
Setting detail: THERAPIES SERIES
Discharge: HOME | End: 2023-05-02
Payer: COMMERCIAL

## 2023-05-02 PROCEDURE — 97110 THERAPEUTIC EXERCISES: CPT | Performed by: PHYSICAL THERAPIST

## 2023-05-02 PROCEDURE — 97140 MANUAL THERAPY 1/> REGIONS: CPT | Performed by: PHYSICAL THERAPIST

## 2023-05-02 NOTE — FLOWSHEET NOTE
[facility]  Phone: [phone]   Fax: [phone]      Physical Therapy Daily Treatment Note  Date:  2023     Patient Name:  Redd Gusman    :  1975  MRN: [id number]    Medical Diagnosis:  Facet arthritis of cervical region [M47.812]  Treatment Diagnosis:    ICD-10-CM    1. Cervical pain (neck)  M54.2         Insurance/Certification information:  PT Insurance Information: [place] not met, Cohere auth, 60 visits  Referring Provider:  Sophie Bence, PA   Has the plan of care been signed (Y/N):        [x]  Yes  []  No     Date of Patient follow up with Physician: none scheduled      Is this a Progress Report:     []  Yes  [x]  No        If Yes:  Date Range for reporting period:  Beginning- 2023    Ending    Progress report will be due (10 Rx or 30 days whichever is less): 10 visits or        Recertification will be due (POC Duration  / 90 days whichever is less): as above        Visit # Insurance Allowable Auth Required   2 10 visits auth thru 23; 60 visits per year [x]  Yes []  No        Functional Scale: NDI- 4%    Date assessed:  2023      Latex Allergy:  [x]NO      []YES  Preferred Language for Healthcare:   [x]English       []other:      Pain level:  0-5/10  on 2023    SUBJECTIVE:  Pt says she has not been bad. She has been doing exercises every other day. She has only noticed her pain a few times recently which she thinks is a little better. When she did get her pain she did not get a headache with it this time.       OBJECTIVE:   Observation: T spine mobility: WNL prone- 23  Test measurements:  from eval  AROM Left  Right   Comments   Cervical Flexion     64 Mild tihgntess around C7   Cervical Extension     60 Mild pain L suboccipital   Cervical Side bend 38 38   Tightness B UTs   Cervical Rotation 55 65   Mild tigtness L suboccipital area   Thoracic rotation

## 2023-07-24 ENCOUNTER — OFFICE VISIT (OUTPATIENT)
Dept: PRIMARY CARE CLINIC | Age: 48
End: 2023-07-24
Payer: COMMERCIAL

## 2023-07-24 VITALS
OXYGEN SATURATION: 98 % | HEIGHT: 67 IN | RESPIRATION RATE: 14 BRPM | DIASTOLIC BLOOD PRESSURE: 60 MMHG | SYSTOLIC BLOOD PRESSURE: 102 MMHG | HEART RATE: 87 BPM | TEMPERATURE: 97 F | BODY MASS INDEX: 22.6 KG/M2 | WEIGHT: 144 LBS

## 2023-07-24 DIAGNOSIS — R20.2 RIGHT HAND PARESTHESIA: ICD-10-CM

## 2023-07-24 DIAGNOSIS — G89.29 CHRONIC RIGHT SHOULDER PAIN: Primary | ICD-10-CM

## 2023-07-24 DIAGNOSIS — M25.511 CHRONIC RIGHT SHOULDER PAIN: Primary | ICD-10-CM

## 2023-07-24 PROBLEM — M79.601 RIGHT ARM PAIN: Status: ACTIVE | Noted: 2023-07-24

## 2023-07-24 PROCEDURE — 99214 OFFICE O/P EST MOD 30 MIN: CPT | Performed by: NURSE PRACTITIONER

## 2023-07-24 RX ORDER — IBUPROFEN 800 MG/1
800 TABLET ORAL
Qty: 90 TABLET | Refills: 0 | Status: SHIPPED | OUTPATIENT
Start: 2023-07-24 | End: 2023-08-23

## 2023-07-24 RX ORDER — METHYLPREDNISOLONE 4 MG/1
TABLET ORAL
Qty: 1 KIT | Refills: 0 | Status: SHIPPED | OUTPATIENT
Start: 2023-07-24 | End: 2023-07-30

## 2023-07-24 ASSESSMENT — ENCOUNTER SYMPTOMS
BACK PAIN: 0
RESPIRATORY NEGATIVE: 1

## 2023-07-24 NOTE — PROGRESS NOTES
7/24/2023    Chief Complaint   Patient presents with    Arm Pain     Right side, started about 3 months ago. Olinda Castaneda is a 46 y.o. female, presents today for right posterior shoulder pain, numbness and tingling of right hand/fingers. HPI  Arm Pain   There was no injury mechanism (Onset ~3 months (early May 2023). Pain scale: 4/10 to 5/10 posterior shoulder and occasionally right elbow. Associated symptoms include numbness (right hand/fingers- daily with intermittent symptoms) and tingling (right hand- intermittent). Treatments tried: chiropractor adjustments, TENS unit, NSAIDs, Tylenol, massage. The treatment provided mild relief. Onset was ~3 months (early May 2023). Symptoms are interfering with sleep- she wakes 4 hours after falling asleep than sleep is on and off after 4 hours. Review of Systems   Constitutional: Negative. Respiratory: Negative. Cardiovascular: Negative. Musculoskeletal:  Negative for arthralgias, back pain, gait problem, joint swelling and neck pain. Positive for right posterior shoulder pain. Occasionally right elbow pain   Skin: Negative. Neurological:  Positive for tingling (right hand- intermittent) and numbness (right hand/fingers- daily with intermittent symptoms). Negative for tremors and weakness. Current Outpatient Medications on File Prior to Visit   Medication Sig Dispense Refill    cetirizine (ZYRTEC) 10 MG tablet Take 1 tablet by mouth daily      polyethyl glycol-propyl glycol 0.4-0.3 % (SYSTANE) 0.4-0.3 % ophthalmic solution 1 drop as needed for Dry Eyes      b complex vitamins capsule Take 1 capsule by mouth daily      Cyanocobalamin (VITAMIN B 12 PO) Take 1,000 mg by mouth. Multiple Vitamins-Minerals (MULTIVITAMIN PO) Take  by mouth. No current facility-administered medications on file prior to visit.       Allergies   Allergen Reactions    Bactrim Ds [Sulfamethoxazole-Trimethoprim] Other (See Comments)

## 2023-07-27 ENCOUNTER — OFFICE VISIT (OUTPATIENT)
Dept: ORTHOPEDIC SURGERY | Age: 48
End: 2023-07-27

## 2023-07-27 VITALS — WEIGHT: 144 LBS | HEIGHT: 67 IN | BODY MASS INDEX: 22.6 KG/M2

## 2023-07-27 DIAGNOSIS — R20.2 PARESTHESIA OF RIGHT ARM: ICD-10-CM

## 2023-07-27 DIAGNOSIS — M54.2 NECK PAIN: ICD-10-CM

## 2023-07-27 DIAGNOSIS — M47.812 CERVICAL SPONDYLOSIS: ICD-10-CM

## 2023-07-31 NOTE — PROGRESS NOTES
Procedures:     Orders Placed This Encounter   Procedures    Margy Mcdonald MD (Inpatient and Outpatient EMG), [facility]     Referral Priority:   Routine     Referral Type:   Eval and Treat     Referral Reason:   Specialty Services Required     Referred to Provider:   Kaitlin Aguilar MD     Requested Specialty:   Neurology     Number of Visits Requested:   1           Other Outside Imaging and Testing Personally Reviewed:    X-rays referenced from April 2023: Relative straightening of the cervical spine normal alignment on the AP projection. No areas of focal advanced intervertebral to space narrowing and only mild spondylosis. Assessment   Impression: . Encounter Diagnoses   Name Primary? Paresthesia of right arm     Cervical spondylosis     Neck pain         Rule out cubital tunnel syndrome, rule out cervical radiculopathy      Plan: Activity modification cervical spine precautions  Stabilization home exercise program  Ulnar nerve gliding exercises and nocturnal elbow splinting-[place]  Complete the course of Medrol as prescribed  Monitor right posterior shoulder girdle pain    Low clinical suspicion that her posterior shoulder girdle pain is radicular in etiology at this time however we will consider advanced imaging of the cervical spine pending results of EMG. The nature of the finding, probable diagnosis and likely treatment was thoroughly discussed with the patient. The options, risks, complications, alternative treatment as well as some of the differential diagnosis was discussed. The patient was thoroughly informed and all questions were answered. the patient indicated understanding and satisfaction with the discussion.       Orders:        Orders Placed This Encounter   Procedures    Margy Mcdonald MD (Inpatient and Outpatient EMG), [facility]     Referral Priority:   Routine     Referral Type:   Eval and Treat     Referral Reason:   Specialty Services Required

## 2023-08-15 ENCOUNTER — PROCEDURE VISIT (OUTPATIENT)
Dept: NEUROLOGY | Age: 48
End: 2023-08-15
Payer: COMMERCIAL

## 2023-08-15 DIAGNOSIS — R20.2 PARESTHESIA OF RIGHT ARM: Primary | ICD-10-CM

## 2023-08-15 PROCEDURE — 95886 MUSC TEST DONE W/N TEST COMP: CPT | Performed by: PSYCHIATRY & NEUROLOGY

## 2023-08-15 PROCEDURE — 95909 NRV CNDJ TST 5-6 STUDIES: CPT | Performed by: PSYCHIATRY & NEUROLOGY

## 2023-08-15 NOTE — PROGRESS NOTES
Kelly Hsu M.D. [facility]  Board Certified in [address], [address] [facility], [address] [place] [address]    EMG / NERVE CONDUCTION STUDY      PATIENT:  Se Lopez       DATE OF EM/15/23     YOB: 1975       REASON FOR EMG:   Right arm numbness      REFERRING PHYSICIAN:  Ileana Fisher MD  [facility],  [address]     SUMMARY:   The right median motor and sensory nerve studies are prolonged distal latencies  The right ulnar motor nerve study had a slowing of conduction velocity across the elbow  The right ulnar and radial sensory nerve studies were normal  Needle EMG of several muscles in the right upper extremity including the cervical paraspinal muscles was normal      CLINICAL DIAGNOSIS:  Neuropathy versus radiculopathy        EMG RESULTS:     1. This patient has a mild to moderately severe right median nerve lesion at the wrist.  (Carpal tunnel syndrome). 2.  This patient also has a mild right ulnar nerve lesion at the elbow.        ---------------------------------------------  Kelly Hsu M.D.   Electromyographer / Neurologist

## 2023-08-15 NOTE — PATIENT INSTRUCTIONS
Verbal consent was obtained from patient and/or patient's advocate for in office procedure with Dr. Florentino Gosselin (EMG or EEG).

## 2023-08-21 ENCOUNTER — OFFICE VISIT (OUTPATIENT)
Dept: ORTHOPEDIC SURGERY | Age: 48
End: 2023-08-21

## 2023-08-21 VITALS — WEIGHT: 144 LBS | HEIGHT: 67 IN | BODY MASS INDEX: 22.6 KG/M2

## 2023-08-21 DIAGNOSIS — G56.01 RIGHT CARPAL TUNNEL SYNDROME: ICD-10-CM

## 2023-08-21 DIAGNOSIS — M50.20 CERVICAL DISCOGENIC PAIN SYNDROME: ICD-10-CM

## 2023-08-21 DIAGNOSIS — M47.812 CERVICAL SPONDYLOSIS: ICD-10-CM

## 2023-08-21 DIAGNOSIS — M54.2 NECK PAIN: ICD-10-CM

## 2023-08-21 NOTE — PROGRESS NOTES
Chief Complaint:   Chief Complaint   Patient presents with    Arm Pain     Right are EMG TR- Medrol helped about 100%. Not doing much of the home exercises because they seem to aggravate it more           History of Present Illness:       Patient is a 46 y.o. female returns follow up for the above complaint. The patient was last seen approximately 3 weeksago. The symptoms remain problematic since the last visit. The patient has had further testing for the problem. She experienced significant therapeutic benefit from the trial of Medrol eliminating the pain in the posterior shoulder girdle and brachium. Unfortunately the posterior shoulder girdle pain has resurfaced. EMG completed in the interim outlined below in detail    Neck: Right arm/shoulder  pain 30: 70. Pain is aching or sharp in quality localized to the posterior shoulder girdle    She has completed greater than 6 weeks of supervised PT in combination with medically directed home physical therapy program in addition to chiropractic care. Despite this her symptoms remain problematic. Pain levels:3. The patient denies new onset or progressive weakness of the upper extremities. The patient denies new onset gait disturbance. Past Medical History:      No past medical history on file. Present Medications:         Current Outpatient Medications   Medication Sig Dispense Refill    ibuprofen (ADVIL;MOTRIN) 800 MG tablet Take 1 tablet by mouth 3 times daily (with meals) 90 tablet 0    cetirizine (ZYRTEC) 10 MG tablet Take 1 tablet by mouth daily      polyethyl glycol-propyl glycol 0.4-0.3 % (SYSTANE) 0.4-0.3 % ophthalmic solution 1 drop as needed for Dry Eyes      b complex vitamins capsule Take 1 capsule by mouth daily      Cyanocobalamin (VITAMIN B 12 PO) Take 1,000 mg by mouth. Multiple Vitamins-Minerals (MULTIVITAMIN PO) Take  by mouth. No current facility-administered medications for this visit.

## 2023-09-11 ENCOUNTER — OFFICE VISIT (OUTPATIENT)
Dept: ORTHOPEDIC SURGERY | Age: 48
End: 2023-09-11
Payer: COMMERCIAL

## 2023-09-11 VITALS — BODY MASS INDEX: 22.6 KG/M2 | WEIGHT: 144 LBS | HEIGHT: 67 IN

## 2023-09-11 DIAGNOSIS — M50.223 HERNIATION OF INTERVERTEBRAL DISC AT C6-C7 LEVEL: ICD-10-CM

## 2023-09-11 DIAGNOSIS — M50.222 HERNIATION OF INTERVERTEBRAL DISC AT C5-C6 LEVEL: Primary | ICD-10-CM

## 2023-09-11 DIAGNOSIS — S46.811S TRAPEZIUS MUSCLE STRAIN, RIGHT, SEQUELA: ICD-10-CM

## 2023-09-11 DIAGNOSIS — M47.812 CERVICAL SPONDYLOSIS: ICD-10-CM

## 2023-09-11 PROCEDURE — 99213 OFFICE O/P EST LOW 20 MIN: CPT | Performed by: INTERNAL MEDICINE

## 2023-09-11 NOTE — PROGRESS NOTES
Chief Complaint:   Chief Complaint   Patient presents with    Arm Pain     Cervical MRI results- feeling a little better- still feeling something at the base of neck/top of right shoulder- doing HEP and bracing at night which seem to help          History of Present Illness:       Patient is a 46 y.o. female returns follow up for the above complaint. The patient was last seen approximately 3 weeksago. The symptoms overall stable since the last visit. The patient has had further testing for the problem. MRI completed in the interim. Neck:Right shoulder pain 0: 100. Pain in right upper trapezius is tightness in quality-not neuritic. Pain levels: Low-grade . The patient denies new onset or progressive weakness of the upper extremities. The patient claims new onset gait disturbance. No reliance on NSAIDs or other analgesics    Nocturnal wrist splinting has been therapeutic for the carpal tunnel symptoms along with median nerve glides     Past Medical History:      No past medical history on file. Present Medications:         Current Outpatient Medications   Medication Sig Dispense Refill    ibuprofen (ADVIL;MOTRIN) 800 MG tablet Take 1 tablet by mouth 3 times daily (with meals) 90 tablet 0    cetirizine (ZYRTEC) 10 MG tablet Take 1 tablet by mouth daily      polyethyl glycol-propyl glycol 0.4-0.3 % (SYSTANE) 0.4-0.3 % ophthalmic solution 1 drop as needed for Dry Eyes      b complex vitamins capsule Take 1 capsule by mouth daily      Cyanocobalamin (VITAMIN B 12 PO) Take 1,000 mg by mouth. Multiple Vitamins-Minerals (MULTIVITAMIN PO) Take  by mouth. No current facility-administered medications for this visit. Allergies: Allergies   Allergen Reactions    Bactrim Ds [Sulfamethoxazole-Trimethoprim] Other (See Comments)     Sensitivity. Hands tingling and did not \"feel right\". Macrodantin [Nitrofurantoin Macrocrystal] Other (See Comments)     Sensitivity.   Hands

## 2023-09-15 ENCOUNTER — HOSPITAL ENCOUNTER (OUTPATIENT)
Dept: PHYSICAL THERAPY | Age: 48
Setting detail: THERAPIES SERIES
Discharge: HOME | End: 2023-09-15
Payer: COMMERCIAL

## 2023-09-15 DIAGNOSIS — M54.2 NECK PAIN: Primary | ICD-10-CM

## 2023-09-15 PROCEDURE — 97140 MANUAL THERAPY 1/> REGIONS: CPT | Performed by: PHYSICAL THERAPIST

## 2023-09-15 PROCEDURE — 97161 PT EVAL LOW COMPLEX 20 MIN: CPT | Performed by: PHYSICAL THERAPIST

## 2023-09-15 NOTE — FLOWSHEET NOTE
[address] 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  Neck pain  M54.2         Insurance/Certification information:  PT Insurance Information: Brenda camilo  Referring Provider: Isha Saleh MD       Has the plan of care been signed (Y/N):        []  Yes  [x]  No     Date of Patient follow up with Physician: none scheduled      Is this a Progress Report:     []  Yes  [x]  No        If Yes:  Date Range for reporting period:  Beginning- 9/15/2023    Ending    Progress report will be due (10 Rx or 30 days whichever is less): 10 visits or 50/60/42      Recertification will be due (POC Duration  / 90 days whichever is less): as above        Visit # Insurance Allowable Auth Required   1 Auth req [x]  Yes []  No        Functional Scale: NDI - 2%    Date assessed:  9/15/2023      Latex Allergy:  [x]NO      []YES  Preferred Language for Healthcare:   [x]English       []other:      Pain level:  2-4/10  on 9/15/2023    SUBJECTIVE:  See eval    OBJECTIVE: See eval  Observation:   Test measurements:    SEATED EXAM           AROM Left  Right   Comments   Cervical Flexion     42 Tightness back of neck   Cervical Extension     53     Cervical Side bend 32 stretch in UT 38       Cervical Rotation 65 68   Pain-free         WNL     Shoulder flex     WNL     Shoulder ABD     WNL     Shoulder IR     WNL     Shoulder ER at 90                    Special tests Normal Abnormal N/A Comments   Sharp-Rosita           Spurling x

## 2023-09-19 ENCOUNTER — HOSPITAL ENCOUNTER (OUTPATIENT)
Dept: PHYSICAL THERAPY | Age: 48
Setting detail: THERAPIES SERIES
Discharge: HOME | End: 2023-09-19
Payer: COMMERCIAL

## 2023-09-19 PROCEDURE — 20560 NDL INSJ W/O NJX 1 OR 2 MUSC: CPT | Performed by: PHYSICAL THERAPIST

## 2023-09-19 PROCEDURE — 97140 MANUAL THERAPY 1/> REGIONS: CPT | Performed by: PHYSICAL THERAPIST

## 2023-09-19 NOTE — FLOWSHEET NOTE
follow up with physician  [] Other    Prognosis for POC: [x] Good [] Fair  [] Poor      Patient requires continued skilled intervention: [x] Yes  [] No      ASSESSMENT:  See eval    Treatment/Activity Tolerance:  [x] Patient tolerated treatment well [] Patient limited by fatique  [] Patient limited by pain  [] Patient limited by other medical complications  [x] Other: pt was most sore with palpation over R UT so did DN to this muscle today. Pt tolerated well with no adverse reactions. No LTRs but muscle felt a lot more loose after DN. She was tight throughout her LS and also appeared to have elevated 2nd rib so continued with 2nd rib mob and STM throughout LS and other R cervical muscles as above. Pt tolerated session well. She was cued to avoid superficial muscle activation throughout chin tucks. Prognosis: [x] Good [] Fair  [] Poor    Patient Requires Follow-up: [x] Yes  [] No    PLAN: See eval; consider DN to UT prone or LS; continue STM and manual traction; consider scap ret to HEP; chin tucks and scap ret prone  [x] Continue per plan of care [] Alter current plan (see comments above)  [] Plan of care initiated [] Hold pending MD visit [] Discharge    Note: If patient does not return for scheduled/ recommended follow up visits, this note will serve as a discharge from care along with most recent update on progress.      Electronically signed by: Malia Plascencia, PT PT, DPT

## 2023-09-20 ENCOUNTER — TELEPHONE (OUTPATIENT)
Dept: ORTHOPEDIC SURGERY | Age: 48
End: 2023-09-20

## 2023-09-20 NOTE — TELEPHONE ENCOUNTER
Other ROBYN WITH [employer] IS CALLING REQUESTING A CALL BACK. SHE RECEIVED AN MRI ORDER FOR BACK 9/13 BUT PATIENT COMPLETED ONE IN [place].  [address] [address] [phone] [facility]

## 2023-09-21 ENCOUNTER — APPOINTMENT (OUTPATIENT)
Dept: PHYSICAL THERAPY | Age: 48
End: 2023-09-21
Payer: COMMERCIAL

## 2023-09-22 ENCOUNTER — HOSPITAL ENCOUNTER (OUTPATIENT)
Dept: PHYSICAL THERAPY | Age: 48
Setting detail: THERAPIES SERIES
Discharge: HOME | End: 2023-09-22
Payer: COMMERCIAL

## 2023-09-22 PROCEDURE — 20560 NDL INSJ W/O NJX 1 OR 2 MUSC: CPT | Performed by: PHYSICAL THERAPIST

## 2023-09-22 PROCEDURE — 97140 MANUAL THERAPY 1/> REGIONS: CPT | Performed by: PHYSICAL THERAPIST

## 2023-09-22 PROCEDURE — 97112 NEUROMUSCULAR REEDUCATION: CPT | Performed by: PHYSICAL THERAPIST

## 2023-09-22 NOTE — FLOWSHEET NOTE
[address] [facility] [employer] [facility], [address] [facility] 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 Neck pain  M54.2         Insurance/Certification information:  PT Insurance Information: Lindy camilo  Referring Provider: Man Ingram MD       Has the plan of care been signed (Y/N):        [x]  Yes  []  No     Date of Patient follow up with Physician: none scheduled      Is this a Progress Report:     []  Yes  [x]  No        If Yes:  Date Range for reporting period:  Beginning- 9/15/2023    Ending    Progress report will be due (10 Rx or 30 days whichever is less): 10 visits or       Recertification will be due (POC Duration  / 90 days whichever is less): as above        Visit # Insurance Allowable Auth Required   3 12 visits auth thru  [x]  Yes []  No        Functional Scale: NDI - 2%    Date assessed:  9/15/2023      Latex Allergy:  [x]NO      []YES  Preferred Language for Healthcare:   [x]English       []other:      Pain level:  1/10 \"its there\"  on 2023    SUBJECTIVE: pt says she felt good right after dry needling last session. However she has has more tingling/numbish feeling all week in her R UE/fingers. She says it is faint feeling constantly but can get intense. Also a feeling of a pull in her R UT area and feels a pull to lateral shoulder.      OBJECTIVE: See eval  Observation:   Test measurements:    SEATED EXAM           AROM Left  Right   Comments   Cervical Flexion     42 Tightness back of neck   Cervical Extension     53

## 2023-09-25 ENCOUNTER — HOSPITAL ENCOUNTER (OUTPATIENT)
Dept: PHYSICAL THERAPY | Age: 48
Setting detail: THERAPIES SERIES
Discharge: HOME | End: 2023-09-25
Payer: COMMERCIAL

## 2023-09-25 ENCOUNTER — APPOINTMENT (OUTPATIENT)
Dept: PHYSICAL THERAPY | Age: 48
End: 2023-09-25
Payer: COMMERCIAL

## 2023-09-25 PROCEDURE — 97140 MANUAL THERAPY 1/> REGIONS: CPT | Performed by: PHYSICAL THERAPIST

## 2023-09-25 PROCEDURE — 20560 NDL INSJ W/O NJX 1 OR 2 MUSC: CPT | Performed by: PHYSICAL THERAPIST

## 2023-09-25 NOTE — FLOWSHEET NOTE
[address]   [] EVAL (HIGH) 67105   [] RE-EVAL   [] YK(56759) x     [] IONTO  [] NMR (50602) x    [] VASO  [x] Manual (80092) x   2   [x] Other:DN 1-2 muscles  [] TA x      [] Mech Traction (98329)  [] ES(attended) (22042)      [] ES (un) (45750):       GOALS: Patient stated goal: stop the feeling/tightness that is in her R UT  Therapist goals for Patient:   Short Term Goals: To be achieved in: 2 weeks  1. Independent in HEP and progression per patient tolerance, in order to prevent re-injury. [] Progressing: [x] Met: [] Not Met: [] Adjusted   2. Patient will have a decrease in pain to facilitate improvement in movement, function, and ADLs as indicated by Functional Deficits. [] Progressing: [x] Met: [] Not Met: [] Adjusted     Long Term Goals: To be achieved in: 4-6 weeks  1. Functional index score of 0% for NDI to assist with reaching prior level of function. [] Progressing: [] Met: [] Not Met: [] Adjusted  2. Patient will demonstrate increased cervical AROM to 100 flex/extn pain-free to allow for proper joint functioning as indicated by patients Functional Deficits. [] Progressing: [] Met: [] Not Met: [] Adjusted  3. Patient will demonstrate an increase in postural awareness and control and activation of  Deep cervical stabilizers to allow for proper functional mobility as indicated by patients Functional Deficits. [] Progressing: [] Met: [] Not Met: [] Adjusted  4. Patient will return to lifting objects overhead without increased symptoms or restriction. [] Progressing: [] Met: [] Not Met: [] Adjusted  5. Pt will return to yard-work without increased symptoms or restriction  [] Progressing: [] Met: [] Not Met: [] Adjusted          Overall Progression Towards Functional goals/ Treatment Progress Update:  [] Patient is progressing as expected towards functional goals listed. [] Progression is slowed due to complexities/Impairments listed. [] Progression has been slowed due to co-morbidities.   [x]

## 2023-10-02 ENCOUNTER — HOSPITAL ENCOUNTER (OUTPATIENT)
Dept: PHYSICAL THERAPY | Age: 48
Setting detail: THERAPIES SERIES
Discharge: HOME | End: 2023-10-02
Payer: COMMERCIAL

## 2023-10-02 PROCEDURE — 20560 NDL INSJ W/O NJX 1 OR 2 MUSC: CPT | Performed by: PHYSICAL THERAPIST

## 2023-10-02 PROCEDURE — 97140 MANUAL THERAPY 1/> REGIONS: CPT | Performed by: PHYSICAL THERAPIST

## 2023-10-02 NOTE — FLOWSHEET NOTE
[address] [facility] [employer] [facility] Therapy, [address] [place] [address]. 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 Neck pain  M54.2         Insurance/Certification information:  PT Insurance Information: Arianna camilo  Referring Provider: Jenaro Park MD       Has the plan of care been signed (Y/N):        [x]  Yes  []  No     Date of Patient follow up with Physician: none scheduled      Is this a Progress Report:     []  Yes  [x]  No        If Yes:  Date Range for reporting period:  Beginning- 9/15/2023    Ending    Progress report will be due (10 Rx or 30 days whichever is less): 10 visits or       Recertification will be due (POC Duration  / 90 days whichever is less): as above        Visit # Insurance Allowable Auth Required   5 12 visits auth thru  [x]  Yes []  No        Functional Scale: NDI - 2%    Date assessed:  9/15/2023      Latex Allergy:  [x]NO      []YES  Preferred Language for Healthcare:   [x]English       []other:      Pain level:  1/10 \"its there\"  on 10/2/2023    SUBJECTIVE:  Monday she felt good. Tuesday she did something and then Tuesday and Wednesday she had more n/t. She was then out of town and let her arm rest. Her hand sometimes gets numb in certain positions like it would when first started. She still feels a lot of tension in her UT again like this is where she aggravated it. She did clean at work on Tuesday. It is setting down after Tuesday though.        OBJECTIVE: See eval  Observation:   Test measurements:    SEATED EXAM

## 2023-10-05 ENCOUNTER — HOSPITAL ENCOUNTER (OUTPATIENT)
Dept: PHYSICAL THERAPY | Age: 48
Setting detail: THERAPIES SERIES
Discharge: HOME | End: 2023-10-05
Payer: COMMERCIAL

## 2023-10-05 PROCEDURE — 20560 NDL INSJ W/O NJX 1 OR 2 MUSC: CPT | Performed by: PHYSICAL THERAPIST

## 2023-10-05 PROCEDURE — 97112 NEUROMUSCULAR REEDUCATION: CPT | Performed by: PHYSICAL THERAPIST

## 2023-10-05 PROCEDURE — 97140 MANUAL THERAPY 1/> REGIONS: CPT | Performed by: PHYSICAL THERAPIST

## 2023-10-05 NOTE — FLOWSHEET NOTE
White     Exercises  - Seated Upper Trapezius Stretch  - 2 x daily - 7 x weekly - 3 sets - 1 reps - 30 hold  - Gentle Levator Scapulae Stretch  - 2 x daily - 7 x weekly - 3 sets - 1 reps - 30 hold  - Seated Scapular Retraction  - 2 x daily - 7 x weekly - 1 sets - 10 reps - 5 hold  - Seated Cervical Retraction  - 2 x daily - 7 x weekly - 1 sets - 10 reps - 5 hold     Patient Education  - Office Posture  - Forward Head Posture    Manual Treatments:  PROM / STM / Oscillations-Mobs:  G-I, II, III, IV (PA's, Inf., Post.)  [x] (17172) Provided manual therapy to mobilize proximal hip and LS spine soft tissue/joints for the purpose of modulating pain, promoting relaxation,  increasing ROM, reducing/eliminating soft tissue swelling/inflammation/restriction, improving soft tissue extensibility and allowing for proper ROM for normal function with self care, mobility, lifting and ambulation. Dry needling manual therapy: consisted on the placement of 5 needles in the following muscles:  R levator scapula and cervical multifidi prone C4- C5. A 30, 40 mm needle was inserted, piston, rotated to produce intramuscular mobilization. These techniques were used to restore functional range of motion, reduce muscle spasm and induce healing in the corresponding musculature. (31312)  Clean Technique was utilized today while applying Dry needling treatment. The treatment sites where cleaned with 70% solution of  isopropyl alcohol . The PT washed their hands and utilized treatment gloves along with hand  prior to inserting the needles. All needles were removed and discarded in the appropriate sharps container. DN on MD script    Modalities:     [] GAME READY (VASO)- for significant edema, swelling, pain control.      Charges:  Timed Code Treatment Minutes: 32   Total Treatment Minutes: 45     [] EVAL (LOW) 86061   [] EVAL (MOD) 99611   [] EVAL (HIGH) 73193   [] RE-EVAL   [] LT(18156) x     [] IONTO  [x] NMR (09071) x 1   []

## 2023-10-09 ENCOUNTER — HOSPITAL ENCOUNTER (OUTPATIENT)
Dept: PHYSICAL THERAPY | Age: 48
Setting detail: THERAPIES SERIES
Discharge: HOME | End: 2023-10-09
Payer: COMMERCIAL

## 2023-10-09 PROCEDURE — 97140 MANUAL THERAPY 1/> REGIONS: CPT | Performed by: PHYSICAL THERAPIST

## 2023-10-09 PROCEDURE — 97112 NEUROMUSCULAR REEDUCATION: CPT | Performed by: PHYSICAL THERAPIST

## 2023-10-09 PROCEDURE — 20560 NDL INSJ W/O NJX 1 OR 2 MUSC: CPT | Performed by: PHYSICAL THERAPIST

## 2023-10-11 ENCOUNTER — PATIENT MESSAGE (OUTPATIENT)
Dept: PRIMARY CARE CLINIC | Age: 48
End: 2023-10-11

## 2023-10-11 DIAGNOSIS — Z13.220 LIPID SCREENING: ICD-10-CM

## 2023-10-11 DIAGNOSIS — M25.542 ARTHRALGIA OF BOTH HANDS: Primary | ICD-10-CM

## 2023-10-11 DIAGNOSIS — M25.541 ARTHRALGIA OF BOTH HANDS: Primary | ICD-10-CM

## 2023-10-11 DIAGNOSIS — Z13.1 DIABETES MELLITUS SCREENING: ICD-10-CM

## 2023-10-11 NOTE — TELEPHONE ENCOUNTER
1. Arthralgia of both hands  - BHAVIK; Future  - Comprehensive Metabolic Panel, Fasting; Future  - C-Reactive Protein; Future  - Rheumatoid Factor; Future  - Sedimentation Rate; Future    2. Diabetes mellitus screening  - Hemoglobin A1C; Future    3. Lipid screening  - Lipid, Fasting;  Future

## 2023-10-27 ENCOUNTER — OFFICE VISIT (OUTPATIENT)
Dept: PRIMARY CARE CLINIC | Age: 48
End: 2023-10-27
Payer: COMMERCIAL

## 2023-10-27 VITALS
SYSTOLIC BLOOD PRESSURE: 92 MMHG | OXYGEN SATURATION: 98 % | BODY MASS INDEX: 22.29 KG/M2 | HEIGHT: 67 IN | DIASTOLIC BLOOD PRESSURE: 64 MMHG | RESPIRATION RATE: 16 BRPM | HEART RATE: 77 BPM | WEIGHT: 142 LBS

## 2023-10-27 DIAGNOSIS — M19.049 ARTHRITIS OF HAND: Primary | ICD-10-CM

## 2023-10-27 DIAGNOSIS — E78.00 PURE HYPERCHOLESTEROLEMIA: ICD-10-CM

## 2023-10-27 PROCEDURE — 99214 OFFICE O/P EST MOD 30 MIN: CPT | Performed by: NURSE PRACTITIONER

## 2023-10-27 NOTE — PROGRESS NOTES
10/27/2023    Chief Complaint   Patient presents with    Follow-up     Review labs     Arthritis     Bilateral hands and fingers       Jose Hawk is a 46 y.o. female, presents today to review lab results and discuss joint pain to bilateral hands. HPI   Review of lab results  The available labs reviewed and analyzed and independent interpretation of the results explained to patient at length. Arthritic pain  Patient reports joint pain to hands and fingers on both hands. Finger joints swelling with inability to wear her rings. Pain is worse in cold weather. No treatments tried. She reports her mother having arthritis.    Component Ref Range & Units 10/19/23 1042 10/24/17 1108 4/28/14 1025   BHAVIK Negative Negative  POSITIVE Abnormal   POSITIVE Abnormal       Component Ref Range & Units 10/19/23 1042 11/28/17 1056   CRP 0.0 - 5.1 mg/L <3.0  1.7 CM      Component Ref Range & Units 10/19/23 1042 10/24/17 1108 4/28/14 1025   Rheumatoid Factor <14 IU/mL <10.0  <10.0  <10.0      Component Ref Range & Units 10/19/23 1042 11/28/17 1056 10/24/17 1108 4/28/14 1025   Sed Rate 0 - 30 mm/Hr 12  15 R  16 R  13 R      Component Ref Range & Units 10/19/23 1042   Hemoglobin A1C See comment % 5.6      Component Ref Range & Units 10/19/23 1042 10/24/17 1108   Sodium 136 - 145 mmol/L 138  138    Potassium 3.5 - 5.1 mmol/L 4.1  4.0    Chloride 99 - 110 mmol/L 98 Low   96 Low     CO2 21 - 32 mmol/L 29  22    Anion Gap 3 - 16 11  20 High     Glucose, Fasting 70 - 99 mg/dL 82     BUN 7 - 20 mg/dL 16  10    Creatinine 0.6 - 1.1 mg/dL 0.6  0.6    Est, Glom Filt Rate >60 >60     Calcium 8.3 - 10.6 mg/dL 9.3  9.5    Total Protein 6.4 - 8.2 g/dL 6.7  7.4    Albumin 3.4 - 5.0 g/dL 4.7  4.5    Albumin/Globulin Ratio 1.1 - 2.2 2.4 High   1.6    Total Bilirubin 0.0 - 1.0 mg/dL 0.5  0.3    Alkaline Phosphatase 40 - 129 U/L 53  38 Low     ALT 10 - 40 U/L 40  17    AST 15 - 37 U/L 41 High   26 CM      Plan today:  -Lab results

## 2023-10-28 PROBLEM — E78.00 PURE HYPERCHOLESTEROLEMIA: Status: ACTIVE | Noted: 2023-10-28

## 2023-10-28 PROBLEM — M19.049 ARTHRITIS OF HAND: Status: ACTIVE | Noted: 2023-10-28

## 2023-10-28 LAB
VITAMIN B-12: >2000 PG/ML (ref 232–1245)
VITAMIN D 25-HYDROXY: 48.2 NG/ML (ref 30–100)

## 2023-10-28 ASSESSMENT — ENCOUNTER SYMPTOMS
COLOR CHANGE: 0
CHEST TIGHTNESS: 0
SHORTNESS OF BREATH: 0

## 2023-11-21 ENCOUNTER — OFFICE VISIT (OUTPATIENT)
Dept: ORTHOPEDIC SURGERY | Age: 48
End: 2023-11-21

## 2023-11-21 DIAGNOSIS — M50.30 DDD (DEGENERATIVE DISC DISEASE), CERVICAL: ICD-10-CM

## 2023-11-21 DIAGNOSIS — G56.21 CUBITAL TUNNEL SYNDROME, RIGHT: ICD-10-CM

## 2023-11-21 DIAGNOSIS — M47.812 CERVICAL SPONDYLOSIS: ICD-10-CM

## 2023-11-21 DIAGNOSIS — S46.811S TRAPEZIUS MUSCLE STRAIN, RIGHT, SEQUELA: Primary | ICD-10-CM

## 2023-11-21 DIAGNOSIS — M79.18 MYOFASCIAL MUSCLE PAIN: ICD-10-CM

## 2023-11-24 NOTE — PROGRESS NOTES
Chief Complaint:   Chief Complaint   Patient presents with    Arm Pain          History of Present Illness:       Patient is a 46 y.o. female returns follow up for the above complaint. The patient was last seen approximately 2 monthsago and lost to follow-up. The symptoms are overall improved but remain problematic since the last visit. The patient has had no further testing for the problem. Workup ihas included EMG and MRI cervical spine referenced in the medical record. EMG remarkable for mild right ulnar nerve lesion at the elbow and mild to moderate severe right median nerve lesion at the wrist.    Focus of \"annoying slight dull ache\" discomfort localizes to the right upper trap she has undergone a trial of DNT in the interim with only temporary therapeutic benefit. Pain levels 2/10. This is more notable with lying at bedtime    Superimposed on this is neuritic symptoms in the right hand the following ulnar nerve distribution primarily    No reliance on NSAIDs or other analgesics             Past Medical History:        Past Medical History:   Diagnosis Date    Pure hypercholesterolemia 10/28/2023        Present Medications:         Current Outpatient Medications   Medication Sig Dispense Refill    diclofenac sodium (VOLTAREN) 1 % GEL Apply 2 g topically 4 times daily 350 g 5    cetirizine (ZYRTEC) 10 MG tablet Take 1 tablet by mouth daily      polyethyl glycol-propyl glycol 0.4-0.3 % (SYSTANE) 0.4-0.3 % ophthalmic solution 1 drop as needed for Dry Eyes (Patient not taking: Reported on 10/27/2023)      b complex vitamins capsule Take 1 capsule by mouth daily      Cyanocobalamin (VITAMIN B 12 PO) Take 1,000 mg by mouth. Multiple Vitamins-Minerals (MULTIVITAMIN PO) Take  by mouth. No current facility-administered medications for this visit. Allergies: Allergies   Allergen Reactions    Bactrim Ds [Sulfamethoxazole-Trimethoprim] Other (See Comments)     Sensitivity.   Hands

## 2023-11-29 ENCOUNTER — PATIENT MESSAGE (OUTPATIENT)
Dept: ORTHOPEDIC SURGERY | Age: 48
End: 2023-11-29

## 2024-07-03 ENCOUNTER — COMMUNITY OUTREACH (OUTPATIENT)
Dept: PRIMARY CARE CLINIC | Age: 49
End: 2024-07-03

## 2024-07-03 NOTE — PROGRESS NOTES
Patient's HM shows they are overdue for Mammogram and Colorectal Screening.   Care Everywhere and  files searched.   updated with 2023 Mammogram.     Requested colonoscopy from [facility].